# Patient Record
Sex: FEMALE | Race: WHITE
[De-identification: names, ages, dates, MRNs, and addresses within clinical notes are randomized per-mention and may not be internally consistent; named-entity substitution may affect disease eponyms.]

---

## 2019-04-24 ENCOUNTER — HOSPITAL ENCOUNTER (EMERGENCY)
Dept: HOSPITAL 59 - ER | Age: 44
Discharge: HOME | End: 2019-04-24
Payer: MEDICAID

## 2019-04-24 DIAGNOSIS — R06.00: ICD-10-CM

## 2019-04-24 DIAGNOSIS — F17.210: ICD-10-CM

## 2019-04-24 DIAGNOSIS — R00.2: ICD-10-CM

## 2019-04-24 DIAGNOSIS — J18.1: Primary | ICD-10-CM

## 2019-04-24 LAB
ANION GAP SERPL CALC-SCNC: 13 MMOL/L (ref 7–16)
BUN SERPL-MCNC: 5 MG/DL (ref 6–20)
CO2 SERPL-SCNC: 24 MMOL/L (ref 22–29)
CREAT SERPL-MCNC: 0.7 MG/DL (ref 0.5–0.9)
ERYTHROCYTE [DISTWIDTH] IN BLOOD BY AUTOMATED COUNT: 12.9 % (ref 11.5–14.5)
EST GLOMERULAR FILTRATION RATE: > 60 ML/MIN
GLUCOSE SERPL-MCNC: 112 MG/DL (ref 74–109)
HCT VFR BLD CALC: 37.4 % (ref 35–47)
HGB BLD-MCNC: 12.1 GM/DL (ref 11.6–16)
LYMPHOCYTES NFR BLD: 9 % (ref 16–45)
MCH RBC QN AUTO: 29.5 PG (ref 27–33)
MCHC RBC AUTO-ENTMCNC: 32.4 G/DL (ref 32–36)
MCV RBC AUTO: 91.2 FL (ref 81–97)
MONOCYTES NFR BLD: 9 % (ref 0–9)
NEUTROPHILS NFR BLD AUTO: 81 % (ref 47–80)
NEUTS BAND NFR BLD: 1 % (ref 0–5)
PLATELET # BLD: 218 K/UL (ref 130–400)
PMV BLD AUTO: 10 FL (ref 7.4–10.4)
RBC # BLD AUTO: 4.1 M/UL (ref 3.8–5.4)
WBC # BLD AUTO: 6.8 K/UL (ref 4.2–12.2)

## 2019-04-24 PROCEDURE — 80048 BASIC METABOLIC PNL TOTAL CA: CPT

## 2019-04-24 PROCEDURE — 93005 ELECTROCARDIOGRAM TRACING: CPT

## 2019-04-24 PROCEDURE — 71046 X-RAY EXAM CHEST 2 VIEWS: CPT

## 2019-04-24 PROCEDURE — 93010 ELECTROCARDIOGRAM REPORT: CPT

## 2019-04-24 PROCEDURE — 99284 EMERGENCY DEPT VISIT MOD MDM: CPT

## 2019-04-24 PROCEDURE — 96365 THER/PROPH/DIAG IV INF INIT: CPT

## 2019-04-24 PROCEDURE — 85027 COMPLETE CBC AUTOMATED: CPT

## 2019-04-24 NOTE — EMERGENCY DEPARTMENT RECORD
History of Present Illness





- General


Chief Complaint: Difficulty Breathing


Stated Complaint: ADONAY


Time Seen by Provider: 19 16:51


Mode of Arrival: Ambulatory





- History of Present Illness


Initial Comments: 


seen at urgent care today and told her she had pneumonia 4 hours ago and seen 

at urgent care on [facility] in [place].  patient went up a flight of stairs and use 

the albuterol inhaler and had fluttering in her chest and dyspnea.  Patient was 

given prednisone and zithromycin and phenergan with codiene cough syrup and an 

albuterol inhaler





Onset/Timin


-: Hour(s)


Severity: Mild


Severity scale (1-10): 7


Quality: Aching


Consistency: Constant


Improves With: Nothing


Worsens With: Nothing


Context: Recent illness, Recent URI


Associated Symptoms: Cough, Palpitations


Treatments Prior to Arrival: Other





- Related Data


Home Oxygen Therapy: No


 Home Medications











 Medication  Instructions  Recorded  Confirmed  Last Taken


 


Multivitamin [Daily Multiple 1 tab PO DAILY 19 Unknown





Vitamin]    








 Previous Rx's











 Medication  Instructions  Recorded


 


Cefdinir [Omnicef] 300 mg PO BID #20 cap 19











 Allergies











Allergy/AdvReac Type Severity Reaction Status Date / Time


 


No Known Drug Allergies Allergy   Verified 19 16:09














Travel Screening





- Travel/Exposure Within Last 30 Days


Have you traveled within the last 30 days?: No





- Travel/Exposure Within Last Year


Have you traveled outside the [place] in the last year?: No





- Additonal Travel Details


Have you been exposed to anyone with a communicable illness?: No





- Travel Symptoms


Symptom Screening: None





Past Medical History





- SOCIAL HISTORY


Smoking Status: Current every day smoker


Alcohol Use: Rare


Drug Use: None





- RESPIRATORY


Hx Respiratory Disorders: Yes


Hx Pneumonia: Yes (Dx today)





- CARDIOVASCULAR


Hx Cardio Disorders: No





- NEURO


Hx Neuro Disorders: No





- GI


Hx GI Disorders: No





- 


Hx Genitourinary Disorders: No





- ENDOCRINE


Hx Endocrine Disorders: No





- MUSCULOSKELETAL


Hx Musculoskeletal Disorders: No





- PSYCH


Hx Psych Problems: No





- HEMATOLOGY/ONCOLOGY


Hx Hematology/Oncology Disorders: No





Family Medical History


Any Significant Family History?: No


Family Hx Comment (NOT TO BE USED IN PLACE OF ITEMS BELOW): adopted





Physical Exam





- General


General Appearance: Alert, Oriented x3, Cooperative, No acute distress





- Head


Head exam: Normal inspection





- Eye


Eye exam: Normal appearance, PERRL


Pupils: Normal accommodation





- ENT


ENT exam: Normal exam, Mucous membranes moist, Normal external ear exam, Normal 

orophraynx, TM's normal bilaterally


Ear exam: Normal external inspection.  negative: External canal tenderness


Nasal Exam: Normal inspection.  negative: Discharge, Sinus tenderness


Mouth exam: Normal external inspection, Tongue normal


Teeth exam: Normal inspection.  negative: Dental caries


Throat exam: Normal inspection.  negative: Tonsillar erythema, Tonsillar exudate





- Neck


Neck exam: Normal inspection, Full ROM.  negative: Tenderness





- Respiratory


Respiratory exam: Rhonchi (rml).  negative: Respiratory distress





- Cardiovascular


Cardiovascular Exam: Regular rate, Normal rhythm, Normal heart sounds





- GI/Abdominal


GI/Abdominal exam: Soft, Normal bowel sounds.  negative: Tenderness





- Rectal


Rectal exam: Deferred





- 


 exam: Deferred





- Extremities


Extremities exam: Normal inspection, Full ROM, Normal capillary refill.  

negative: Tenderness





- Back


Back exam: Reports: Normal inspection, Full ROM.  Denies: Muscle spasm, Rash 

noted, Tenderness





- Neurological


Neurological exam: Alert, Normal gait, Oriented X3, Reflexes normal





- Psychiatric


Psychiatric exam: Normal affect, Normal mood





- Skin


Skin exam: Dry, Intact, Normal color, Warm





Course





 Vital Signs











  19





  15:58


 


Temperature 98.6 F


 


Pulse Rate 107 H


 


Respiratory 17





Rate 


 


Blood Pressure 133/83


 


Pulse Ox 95














- Reevaluation(s)


Reevaluation #1: 


patient was given the choice of going home or admission and she wants to go 

home. pulse ox 95% 


19 18:39








Medical Decision Making





- Data Complexity


MDM Data: Labs Ordered and/or Reviewed (wbc 6,800), X-Ray Ordered and/or 

Reviewed (chest xray right middle lobe pneumonia)





- Lab Data


Result diagrams: 


 19 17:25





 19 17:25





Disposition


Clinical Impression: 


Pneumonia


Qualifiers:


 Pneumonia type: due to unspecified organism Laterality: right Lung location: 

middle lobe of lung Qualified Code(s): J18.1 - Lobar pneumonia, unspecified 

organism





Disposition: Home, Self-Care


Condition: (1) Good


Instructions:  Pneumonia (ED)


Additional Instructions: 


follow up with family Dr in 2 to 5 days


drink fluids 


Prescriptions: 


Cefdinir [Omnicef] 300 mg PO BID #20 cap


Forms:  Patient Portal Access


Time of Disposition: 18:47





Quality





- Quality Measures


Quality Measures: N/A





- Blood Pressure Screening


Does Patient Have Any of the Following: No


Blood Pressure Classification: Pre-Hypertensive BP Reading


Systolic Measurement: 133


Diastolic Measurement: 83


Screening for High Blood Pressure: < Pre-Hypertensive BP, F/U Documented > [

]


Pre-Hypertensive Follow-up Interventions: Referral to alternative/primary care 

provider.

## 2019-04-26 NOTE — RADIOLOGY REPORT
EXAM:  CHEST, TWO VIEWS



HISTORY:  RECENTLY DIAGNOSED RIGHT SIDED PNEUMONIA, DIFFICULTY BREATHING.  



TECHNIQUE:  Two views of the chest were obtained.  



Comparison:  None.  



FINDINGS:  The cardiac silhouette is within normal size limits.  Mild patchy 
opacity in the right middle lobe.  No significant pleural fluid collection or 
visible pneumothorax.  



IMPRESSION:  

MILD PATCHY RIGHT MIDDLE LOBE OPACITY SUSPICIOUS FOR PNEUMONIA.  



JOB NUMBER:  [id number]
MTDD